# Patient Record
Sex: MALE | Employment: OTHER | URBAN - METROPOLITAN AREA
[De-identification: names, ages, dates, MRNs, and addresses within clinical notes are randomized per-mention and may not be internally consistent; named-entity substitution may affect disease eponyms.]

---

## 2017-04-24 ENCOUNTER — APPOINTMENT (OUTPATIENT)
Dept: OPHTHALMOLOGY | Facility: CLINIC | Age: 77
End: 2017-04-24
Attending: OPHTHALMOLOGY
Payer: COMMERCIAL

## 2017-04-24 DIAGNOSIS — H40.51X3 GLAUCOMA OF RIGHT EYE ASSOCIATED WITH OCULAR DISORDER, SEVERE STAGE: ICD-10-CM

## 2017-04-24 PROCEDURE — 99214 OFFICE O/P EST MOD 30 MIN: CPT | Mod: ZF

## 2017-04-24 PROCEDURE — 92083 EXTENDED VISUAL FIELD XM: CPT | Mod: ZF | Performed by: OPHTHALMOLOGY

## 2017-04-24 ASSESSMENT — SLIT LAMP EXAM - LIDS
COMMENTS: BLEPH, MGD
COMMENTS: BLEPH, MGD

## 2017-04-24 ASSESSMENT — VISUAL ACUITY
OS_CC+: -3
CORRECTION_TYPE: GLASSES
METHOD: SNELLEN - LINEAR
OD_CC: 20/300
OS_CC: 20/20

## 2017-04-24 ASSESSMENT — REFRACTION_WEARINGRX
OD_SPHERE: PLANO
SPECS_TYPE: PAL
OS_AXIS: 109
OS_CYLINDER: +1.00
OS_ADD: +2.75
OS_SPHERE: -2.25
OD_CYLINDER: +0.25
OD_AXIS: 060
OD_ADD: +2.75

## 2017-04-24 ASSESSMENT — TONOMETRY
OS_IOP_MMHG: 12
IOP_METHOD: TONOPEN
OD_IOP_MMHG: 08

## 2017-04-24 ASSESSMENT — CONF VISUAL FIELD
OS_NORMAL: 1
OD_INFERIOR_NASAL_RESTRICTION: 1
OD_INFERIOR_TEMPORAL_RESTRICTION: 1

## 2017-04-24 ASSESSMENT — CUP TO DISC RATIO: OS_RATIO: 0.6

## 2017-04-24 ASSESSMENT — EXTERNAL EXAM - LEFT EYE: OS_EXAM: NORMAL

## 2017-04-24 ASSESSMENT — EXTERNAL EXAM - RIGHT EYE: OD_EXAM: NORMAL

## 2017-04-24 NOTE — MR AVS SNAPSHOT
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      Your next 10 appointments already scheduled     Nov 03, 2017  8:45 AM CDT   RETURN GLAUCOMA with Yaneth Burnette MD   Eye Clinic ([facility])    [address]  [address]  [facility] [address] [phone]            Nov 03, 2017  9:45 AM CDT   RETURN CORNEA with Deandre Johnson MD   Eye Clinic ([facility])    [address]  [address] [facility]  [address] [address]  [facility] [address] [phone]              Who to contact     Please call your clinic at [phone] to:    Ask questions about your health    Make or cancel appointments    Discuss your medicines    Learn about your test results    Speak to your doctor   If you have compliments or concerns about an experience at your clinic, or if you wish to file a complaint, please contact [facility] Patient Relations at [phone] or email us at [email or web address]         Additional Information About Your Visit        MyChart Information     zulily is an electronic gateway that provides easy, online access to your medical records. With zulily, you can request a clinic appointment, read your test results, renew a prescription or communicate with your care team.     To sign up for Tatara Systemst visit the website at [email or web address]   You will be asked to enter the access code listed below, as well as some personal information.  Please follow the directions to create your username and password.     Your access code is: [email or web address]  Expires: 2017  6:31 AM     Your access code will  in 90 days. If you need help or a new code, please contact your [facility] or call [phone] for assistance.        Care EveryWhere ID     This is your Care EveryWhere ID. This could be used by other organizations to access your [place] medical records  [id number]         Blood Pressure from Last 3 Encounters:   No data found for BP    Weight from Last 3 Encounters:   No data found for Wt              We Performed the Following     Low Vision Central OU        Primary Care Provider    Unknown Primary MD [place]       No address on file        Thank you!     Thank you for choosing EYE CLINIC  for your care. Our goal is always to provide you with excellent care. Hearing back from our patients is one way we can continue to improve our services. Please take a few minutes to complete the written survey that you may receive in the mail after your visit with us. Thank you!             Your Updated Medication List - Protect others around you: Learn how to safely use, store and throw away your medicines at www.disposemymeds.org.          This list is accurate as of: 17 10:42 AM.  Always use your most recent med list.                   Brand Name Dispense Instructions for use    ASPIRIN ADULT LOW STRENGTH PO      Take 1 tablet by mouth daily       BROMOCRIPTINE MESYLATE PO      Take 2.5 mg by mouth daily       * fluorometholone 0.1 % ophthalmic susp    FML LIQUIFILM    10 mL    Place 1 drop into the right eye every other day       * fluorometholone 0.1 % ophthalmic susp    FML LIQUIFILM    5 mL    Place 1 drop into the right eye every other day       loratadine 10 MG tablet    CLARITIN     Take 10 mg by mouth as needed       Multi-vitamin Tabs tablet      Take 1 tablet by mouth daily       OMEGA-3 FISH OIL PO      Take 1,000  mg by mouth daily       REFRESH OP      Apply 1 drop to eye as needed       VITAMIN C PO          * Notice:  This list has 2 medication(s) that are the same as other medications prescribed for you. Read the directions carefully, and ask your doctor or other care provider to review them with you.

## 2017-04-24 NOTE — NURSING NOTE
Chief Complaints and History of Present Illnesses   Patient presents with     Glaucoma Follow Up     6 month f/u with LVC     HPI    Affected eye(s):  Both   Symptoms:     No decreased vision   No floaters   Flashes (Comment: has occasional episodes of bright light that appears in the center and then moves to the left; clears after about 30 minutes)   Tearing (Comment: right eye tearing more with more use of eyes)      Duration:  6 months      Do you have eye pain now?:  No      Comments:  6 month f/u secondary glaucoma with LVC today  Pt wonders if the glaucoma has gotten worse in the right eye?  Pt states distance vision seems stable since last visit    Ocular meds:  FML QOD right eye  Shan drops only occasionally  Artificial Tears pretty regularly    Tammy CASTANEDA 9:01 AM April 24, 2017

## 2017-04-24 NOTE — PROGRESS NOTES
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 Secondary Glaucoma   - visual field with fluctuation and poor reliability both eyes    - IOP good   -OCT retinal nerve fiber layer last viist  thinner right eye although intraocular pressure excellent, normal and stable left eye     2. Fuchs Endothelial Dystrophy   - s/p DSAEK follows with Dr. Johnson   - on FML and Shan     3. Epiretinal Membrane, Right Eye   Macular pucker   Macular cyst, hole, pseudohole   Not interested in surgical repair    4. Pseudophakia, Both Eyes    Plan   Return 6 months with OCT retinal nerve fiber layer and dilation       Attending Physician Attestation:  Complete documentation of historical and exam elements from today's encounter can be found in the full encounter summary report (not reduplicated in this progress note). I personally obtained the chief complaint(s) and history of present illness. I confirmed and edited asnecessary the review of systems, past medical/surgical history, family history, social history, and examination findings as documented by others; and I examined the patient myself. I personally reviewed the relevant tests, images, and reports as documented above. I formulated and edited as necessary the assessment and plan and discussed the findings and management plan with the patient and family.  - Yaneth Burnette MD 10:24 AM 4/24/2017

## 2017-11-03 ENCOUNTER — OFFICE VISIT (OUTPATIENT)
Dept: OPHTHALMOLOGY | Facility: CLINIC | Age: 77
End: 2017-11-03
Attending: OPHTHALMOLOGY
Payer: COMMERCIAL

## 2017-11-03 DIAGNOSIS — H18.519 FUCHS' CORNEAL DYSTROPHY: Primary | ICD-10-CM

## 2017-11-03 DIAGNOSIS — H40.51X3 GLAUCOMA OF RIGHT EYE ASSOCIATED WITH OCULAR DISORDER, SEVERE STAGE: ICD-10-CM

## 2017-11-03 DIAGNOSIS — Z94.7 CORNEA REPLACED BY TRANSPLANT: ICD-10-CM

## 2017-11-03 DIAGNOSIS — H40.9 GLAUCOMA: Primary | ICD-10-CM

## 2017-11-03 PROCEDURE — 92133 CPTRZD OPH DX IMG PST SGM ON: CPT | Mod: ZF | Performed by: OPHTHALMOLOGY

## 2017-11-03 PROCEDURE — 92015 DETERMINE REFRACTIVE STATE: CPT | Mod: GY,ZF

## 2017-11-03 PROCEDURE — 40000269 ZZH STATISTIC NO CHARGE FACILITY FEE: Mod: ZF

## 2017-11-03 PROCEDURE — 99214 OFFICE O/P EST MOD 30 MIN: CPT | Mod: ZF

## 2017-11-03 ASSESSMENT — REFRACTION_MANIFEST
OD_AXIS: 057
OD_AXIS: 057
OD_SPHERE: -2.50
OD_ADD: +2.75
OS_CYLINDER: +1.25
OS_AXIS: 105
OD_CYLINDER: +0.50
OD_CYLINDER: +0.50
OD_SPHERE: -2.50
OS_SPHERE: -2.25
OS_CYLINDER: +1.25
OS_AXIS: 105
OS_SPHERE: -2.25
OS_ADD: +2.75
OD_ADD: +2.75
OS_ADD: +2.75

## 2017-11-03 ASSESSMENT — TONOMETRY
OD_IOP_MMHG: 08
OD_IOP_MMHG: 9
IOP_METHOD: APPLANATION
IOP_METHOD: APPLANATION
OS_IOP_MMHG: 10
OS_IOP_MMHG: 10
IOP_METHOD: APPLANATION
OD_IOP_MMHG: 08
OS_IOP_MMHG: 10

## 2017-11-03 ASSESSMENT — EXTERNAL EXAM - RIGHT EYE
OD_EXAM: BROW PTOSIS
OD_EXAM: BROW PTOSIS

## 2017-11-03 ASSESSMENT — VISUAL ACUITY
OD_PH_CC: 20/200
OD_CC: 20/250
METHOD: SNELLEN - LINEAR
OD_PH_CC: 20/200
CORRECTION_TYPE: GLASSES
OD_CC: 20/250
METHOD: SNELLEN - LINEAR
OS_CC: 20/30
OS_CC: 20/30
CORRECTION_TYPE: GLASSES

## 2017-11-03 ASSESSMENT — REFRACTION_WEARINGRX
OS_SPHERE: -2.25
OS_AXIS: 109
OD_ADD: +2.75
OD_SPHERE: PLANO
OD_ADD: +2.75
SPECS_TYPE: PAL
OD_CYLINDER: +0.25
OS_ADD: +2.75
OD_CYLINDER: +0.25
OS_CYLINDER: +1.00
OS_AXIS: 109
OS_ADD: +2.75
OS_CYLINDER: +1.00
OD_AXIS: 060
SPECS_TYPE: PAL
OD_SPHERE: PLANO
OS_SPHERE: -2.25
OD_AXIS: 060

## 2017-11-03 ASSESSMENT — CONF VISUAL FIELD
OS_NORMAL: 1
METHOD: COUNTING FINGERS
OS_NORMAL: 1
METHOD: COUNTING FINGERS

## 2017-11-03 ASSESSMENT — CUP TO DISC RATIO
OS_RATIO: 0.6
OS_RATIO: 0.6

## 2017-11-03 ASSESSMENT — PACHYMETRY
OD_CT(UM): 588
OS_CT(UM): 598

## 2017-11-03 ASSESSMENT — EXTERNAL EXAM - LEFT EYE
OS_EXAM: BROW PTOSIS
OS_EXAM: BROW PTOSIS

## 2017-11-03 NOTE — NURSING NOTE
Chief Complaints and History of Present Illnesses   Patient presents with     Glaucoma Follow Up     6 month follow up both eyes.     HPI    Affected eye(s):  Both   Symptoms:     No floaters   Flashes (Comment: Occasional flashing lights in BE, no changes.)   No redness   No Dryness         Do you have eye pain now?:  No      Comments:  Pt states vision is the same as last visit.    Kevin ANGLIN November 3, 2017 8:47 AM

## 2017-11-03 NOTE — MR AVS SNAPSHOT
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      Your next 10 appointments already scheduled     Dec 05, 2017  1:00 PM CST   NEW GENERAL with Polly Rivas MD   Eye Clinic ([facility])    [address]  [address]  [address]  [facility] [address]   [phone]            May 04, 2018  9:00 AM CDT   VISUAL FIELD with [facility] EYE VISUAL FIELD   Eye Clinic ([facility])    [address]  [address]  [address]  [facility] [address]   [phone]            May 04, 2018  9:30 AM CDT   RETURN GLAUCOMA with Yaneth Burnette MD   Eye Clinic ([facility])    [address]  [address]  [address]  [facility] [address]   [phone]              Who to contact     Please call your clinic at [phone] to:    Ask questions about your health    Make or cancel appointments    Discuss your medicines    Learn about your test results    Speak to your doctor   If you have compliments or concerns about an experience at your clinic, or if you wish to file a complaint, please contact [facility] Patient Relations at [phone] or email us at [email or web address]         Additional Information About Your Visit        [employer]hart Information     ALKALINE WATER is an electronic gateway that provides easy, online access to your medical records. With ALKALINE WATER, you can request a clinic appointment, read your test results, renew a prescription or communicate with  your care team.     To sign up for etrigghart visit the website at [email or web address]   You will be asked to enter the access code listed below, as well as some personal information. Please follow the directions to create your username and password.     Your access code is: [email or web address]  Expires: 2018  5:30 AM     Your access code will  in 90 days. If you need help or a new code, please contact your [facility] or call [phone] for assistance.        Care EveryWhere ID     This is your Care EveryWhere ID. This could be used by other organizations to access your [place] medical records  [id number]         Blood Pressure from Last 3 Encounters:   No data found for BP    Weight from Last 3 Encounters:   No data found for Wt              We Performed the Following     US OPHTHALMIC DIAG, PACHYMETRY        Primary Care Provider    None Specified       No primary provider on file.        Equal Access to Services     [facility] HospitalCHIKI : Hadii matteo castilloo Soita, waaxda luqadaha, qaybta kaalmada adekvngyada, rosendo rivera . So [facility] [phone].    ATENCIÓN: Si habla español, tiene a darling disposición servicios gratuitos de asistencia lingüística. Llame al [phone].    We comply with applicable federal civil rights laws and [place] laws. We do not discriminate on the basis of race, color, national origin, age, disability, sex, sexual orientation, or gender identity.            Thank you!     Thank you for choosing EYE CLINIC  for your care. Our goal is always to provide you with excellent care. Hearing back from our patients is one way we can continue to improve our services. Please take a few minutes to complete the written survey that you may receive in the mail after your visit with us. Thank you!             Your Updated Medication List - Protect others around you: Learn how to safely use, store and throw away your medicines at  www.disposemymeds.org.          This list is accurate as of: 11/3/17 11:59 PM.  Always use your most recent med list.                   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

## 2017-11-03 NOTE — NURSING NOTE
Chief Complaints and History of Present Illnesses   Patient presents with     Follow Up For     yearly follow up Fuchs s/p Partial thickness corneal transplant endothelial keratoplasty 3/2012 right eye     HPI    Affected eye(s):  Both   Symptoms:     No floaters   No flashes   No redness   No Dryness         Do you have eye pain now?:  No      Comments:  Pt states vision is the same as last visit.    Kevin ANGLIN November 3, 2017 8:49 AM

## 2017-11-03 NOTE — MR AVS SNAPSHOT
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      Your next 10 appointments already scheduled     Dec 05, 2017  1:00 PM CST   NEW GENERAL with Polly Rivas MD   Eye Clinic ([facility])    [address]  [address]  [address]  [facility] [address]   [phone]            May 04, 2018  9:00 AM CDT   VISUAL FIELD with [facility] EYE VISUAL FIELD   Eye Clinic ([facility])    [address]  [address]  [address]  [facility] [address]   [phone]            May 04, 2018  9:30 AM CDT   RETURN GLAUCOMA with Yaneth Burnette MD   Eye Clinic ([facility])    [address]  [address]  [address]  [facility] [address]   [phone]              Who to contact     Please call your clinic at [phone] to:    Ask questions about your health    Make or cancel appointments    Discuss your medicines    Learn about your test results    Speak to your doctor   If you have compliments or concerns about an experience at your clinic, or if you wish to file a complaint, please contact [facility] Patient Relations at [phone] or email us at [email or web address]         Additional Information About Your Visit        MyChart Information     Ziptr is an electronic gateway that provides easy, online access to your medical records. With Ziptr, you can request a clinic appointment, read your test results,  renew a prescription or communicate with your care team.     To sign up for Callio Technologieshart visit the website at [email or web address]   You will be asked to enter the access code listed below, as well as some personal information. Please follow the directions to create your username and password.     Your access code is: [email or web address]  Expires: 2018  6:30 AM     Your access code will  in 90 days. If you need help or a new code, please contact your [facility] or call [phone] for assistance.        Care EveryWhere ID     This is your Care EveryWhere ID. This could be used by other organizations to access your [place] medical records  [id number]         Blood Pressure from Last 3 Encounters:   No data found for BP    Weight from Last 3 Encounters:   No data found for Wt              We Performed the Following     OCT Optic Nerve RNFL Spectralis OU (both eyes)        Primary Care Provider    None Specified       No primary provider on file.        Equal Access to Services     JAVIER WILL : Hadii matteo castilloo Soita, waaxda lunancyadaha, qaybta kaalmada adeceleste, rosendo rivera . So [facility] [phone].    ATENCIÓN: Si habla español, tiene a darling disposición servicios gratuitos de asistencia lingüística. Llame al [phone].    We comply with applicable federal civil rights laws and [place] laws. We do not discriminate on the basis of race, color, national origin, age, disability, sex, sexual orientation, or gender identity.            Thank you!     Thank you for choosing EYE CLINIC  for your care. Our goal is always to provide you with excellent care. Hearing back from our patients is one way we can continue to improve our services. Please take a few minutes to complete the written survey that you may receive in the mail after your visit with us. Thank you!             Your Updated Medication List - Protect others around you: Learn how to  safely use, store and throw away your medicines at www.disposemymeds.org.          This list is accurate as of: 11/3/17 11:41 AM.  Always use your most recent med list.                   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

## 2017-11-03 NOTE — PROGRESS NOTES
CC: s/p Partial thickness corneal transplant endothelial keratoplasty OD    HPI: Feels vision is getting slightly blurrier in the left eye.  No pain.  Constant.  Moderate.  He is not interested in surgery.    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   Artifical Tears as needed   Refresh pm at bedtime both eyes     Review of Imaging:  [facility] visual field 4/2017   Right eye with constriction, severe, stable but poor reliability   Left eye with poor reliability and scatter, no focal change    OCT retinal nerve fiber layer 11/03/17:   Right eye: lamellar hole. Worsening inferior nasal thinning (-7) now borderline may be related to irregularity of retina. Stable superior thinning.  Areas of drop out.    Left eye: all green stable.     Assessment  1. Secondary Glaucoma   - visual field with fluctuation and poor reliability both eyes    - IOP good   -OCT retinal nerve fiber layer last viist  thinner right eye may be related to abnormal tracing. IOP  intraocular pressure excellent, normal and stable left eye     2. Fuchs Endothelial Dystrophy   - s/p DSAEK OD   - on FML, continue every other day   - no longer taking Shan 128   - discussed guttae affecting vision OS, but not bothered enough by vision to pursue DSAEK    3. Epiretinal Membrane, Right Eye - follows with [facility]   Macular pucker   Macular cyst, hole, pseudohole   Not interested in surgical repair    4. Pseudophakia, Both Eyes    F/u with glaucoma and retina  1 year with cornea    Abilio Enriquez, DO  Cornea Fellow      ~~~~~~~~~~~~~~~~~~~~~~~~~~~~~~~~~~~~~~~~~~~~~~~~~~~~~~~~~~~~~~~~    Complete documentation of historical and exam elements from today's encounter can be found in the full encounter summary report (not reduplicated in this progress note). I personally obtained the chief complaint(s) and history of present illness.  I confirmed and  edited as necessary the review of systems, past medical/surgical history, family history, social history, and examination findings as documented by others.  I examined the patient myself, and I personally reviewed the relevant tests, images, and reports as documented above. I formulated and edited as necessary the assessment and plan and discussed the findings and management plan with the patient and family.     Deandre Johnson MD, MA  Director, Cornea & Anterior Segment  [facility] Department of Ophthalmology & Visual Neuroscience

## 2017-11-03 NOTE — PROGRESS NOTES
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   Artifical Tears as needed   Refresh pm at bedtime both eyes     Review of Imaging:  [facility] visual field 4/2017   Right eye with constriction, severe, stable but poor reliability   Left eye with poor reliability and scatter, no focal change    OCT retinal nerve fiber layer 11/03/17:   Right eye: lamellar hole. Worsening inferior nasal thinning (-7) now borderline may be related to irregularity of retina. Stable superior thinning.  Areas of drop out.    Left eye: all green stable.     Assessment  1. Secondary Glaucoma   - visual field with fluctuation and poor reliability both eyes    - IOP good   -OCT retinal nerve fiber layer last viist  thinner right eye may be related to abnormal tracing. IOP  intraocular pressure excellent, normal and stable left eye     2. Fuchs Endothelial Dystrophy   - s/p DSAEK follows with Dr. Johnson   - on FML   - no longer taking Shan 128    3. Epiretinal Membrane, Right Eye   Macular pucker   Macular cyst, hole, pseudohole   Not interested in surgical repair    4. Pseudophakia, Both Eyes    Plan  Return 6 months with [facility] both eyes   Increase artificial tears and consider adding gel or ointment  Referral to retina regarding lamellar hole/ERM and retinal thickening with Dr. Rob Damon MD  Ophthalmology PGY3    Attending Physician Attestation:  Complete documentation of historical and exam elements from today's encounter can be found in the full encounter summary report (not reduplicated in this progress note). I personally obtained the chief complaint(s) and history of present illness. I confirmed and edited asnecessary the review of systems, past medical/surgical history, family history, social history, and examination  findings as documented by others; and I examined the patient myself. I personally reviewed the relevant tests, images, and reports as documented above. I formulated and edited as necessary the assessment and plan and discussed the findings and management plan with the patient and family.  - Yaneth Burnette MD 10:39 AM 11/3/2017

## 2017-11-28 DIAGNOSIS — H35.371 EPIRETINAL MEMBRANE (ERM) OF RIGHT EYE: Primary | ICD-10-CM

## 2018-02-06 ENCOUNTER — OFFICE VISIT (OUTPATIENT)
Dept: OPHTHALMOLOGY | Facility: CLINIC | Age: 78
End: 2018-02-06
Attending: OPHTHALMOLOGY
Payer: COMMERCIAL

## 2018-02-06 DIAGNOSIS — H35.341 LAMELLAR MACULAR HOLE, RIGHT: Primary | ICD-10-CM

## 2018-02-06 DIAGNOSIS — H43.812 VITREOUS DEGENERATION AND DETACHMENT OF LEFT EYE: ICD-10-CM

## 2018-02-06 DIAGNOSIS — H35.371 EPIRETINAL MEMBRANE (ERM) OF RIGHT EYE: ICD-10-CM

## 2018-02-06 PROCEDURE — 92134 CPTRZ OPH DX IMG PST SGM RTA: CPT | Mod: ZF | Performed by: OPHTHALMOLOGY

## 2018-02-06 PROCEDURE — G0463 HOSPITAL OUTPT CLINIC VISIT: HCPCS | Mod: ZF

## 2018-02-06 ASSESSMENT — EXTERNAL EXAM - RIGHT EYE: OD_EXAM: NORMAL

## 2018-02-06 ASSESSMENT — SLIT LAMP EXAM - LIDS
COMMENTS: NORMAL
COMMENTS: NORMAL

## 2018-02-06 ASSESSMENT — CUP TO DISC RATIO
OS_RATIO: 0.7
OD_RATIO: 0.7

## 2018-02-06 ASSESSMENT — EXTERNAL EXAM - LEFT EYE: OS_EXAM: NORMAL

## 2018-02-06 ASSESSMENT — VISUAL ACUITY
OS_CC: 20/30
OS_CC+: +1
METHOD: SNELLEN - LINEAR
OD_CC: 20/400

## 2018-02-06 ASSESSMENT — TONOMETRY
OD_IOP_MMHG: 8
OS_IOP_MMHG: 10
IOP_METHOD: TONOPEN

## 2018-02-06 NOTE — PROGRESS NOTES
CC -   ERM OD    INTERVAL HISTORY - No changes since SHARMAINE with me, last saw me 2014    HPI -   Lupillo Garcia is a  77 year old year-old patient with history of severe ERM OD, seen by DDK 2014 elected to observe      PAST OCULAR SURGERY  DSAEK OD 2012  Tube OD 2014  CE/IOL OU    RETINAL IMAGING:  OCT   OD - severe ERM with 2+ edema, lamellar hole, ?PVD  OS - tr ERM, ?PVD      ASSESSMENT & PLAN    1.  ERM OD with lamellar hole   - stable since 2014   - wishes to observe      2. ERM OS   - very mild      3. PVD OU      4.  OAG OD   - sees Vasile    5.  DSAEK OD   - sees Page    return to clinic: 1 year, OCT OU    ATTESTATION     Attending Attestation:     Complete documentation of historical and exam elements from today's encounter can be found in the full encounter summary report (not reduplicated in this progress note).  I personally obtained the chief complaint(s) and history of present illness.  I confirmed and edited as necessary the review of systems, past medical/surgical history, family history, social history, and examination findings as documented by others; and I examined the patient myself.  I personally reviewed the relevant tests, images, and reports as documented above.  I formulated and edited as necessary the assessment and plan and discussed the findings and management plan with the patient and family    Polly Rivas MD, PhD  , Vitreoretinal Surgery  Department of Ophthalmology  [facility]

## 2018-02-06 NOTE — NURSING NOTE
"Chief Complaints and History of Present Illnesses   Patient presents with     Follow Up For     Macular Cyst RE      HPI    Last Eye Exam:  11/3/17   Affected eye(s):  Right   Symptoms:     Blurred vision   Floaters   Flashes (Comment: \" Notice this happening while eating a pickle and cheese at the same time. \" )      Duration:  3 months   Frequency:  Constant       Do you have eye pain now?:  No      Comments:  Pt returns for follow up on RE. Vision is more blurry, gradual progressive changes noticed since last visit. No problems using FML, notes that he uses it every other day. RAMÍREZ WASHINGTON, COA 9:56 AM 02/06/2018                "

## 2018-02-06 NOTE — MR AVS SNAPSHOT
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      Your next 10 appointments already scheduled     May 04, 2018  9:00 AM CDT   VISUAL FIELD with [facility] EYE VISUAL FIELD   Eye Clinic ([facility])    [address]  [address]   [address] [address]  [facility] [address]   [phone]            May 04, 2018  9:30 AM CDT   RETURN GLAUCOMA with Yaneth Burnette MD   Eye Clinic ([facility])    [address]  [address]   [address] [address]  [facility] [address]   [phone]              Who to contact     Please call your clinic at [phone] to:    Ask questions about your health    Make or cancel appointments    Discuss your medicines    Learn about your test results    Speak to your doctor   If you have compliments or concerns about an experience at your clinic, or if you wish to file a complaint, please contact [facility] Patient Relations at [phone] or email us at [email or web address]         Additional Information About Your Visit        MyChart Information     [employer] gives you secure access to your electronic health record. If you see a primary care provider, you can also send messages to your care team and make appointments. If you have questions, please call your primary care clinic.  If you do not have a primary care provider, please call [phone] and they will assist you.      [email or web address] is an electronic  gateway that provides easy, online access to your medical records. With [employer], you can request a clinic appointment, read your test results, renew a prescription or communicate with your care team.     To access your existing account, please contact your [facility] or call [phone] for assistance.        Care EveryWhere ID     This is your Care EveryWhere ID. This could be used by other organizations to access your [place] medical records  [id number]         Blood Pressure from Last 3 Encounters:   No data found for BP    Weight from Last 3 Encounters:   No data found for Wt              We Performed the Following     OCT Retina Spectralis OU (both eyes)        Primary Care Provider Office Phone # Fax #    Deandre Duncan [phone]       [facility] [address] [address]        Equal Access to Services     JAVIER WILL : Hadii aad ku hadasho Soomaali, waaxda luqadaha, qaybta kaalmada adeegyada, waxay kerriein hayhodan rivera . So [facility] [phone].    ATENCIÓN: Si habla español, tiene a darling disposición servicios gratuitos de asistencia lingüística. Zeynep al [phone].    We comply with applicable federal civil rights laws and [place] laws. We do not discriminate on the basis of race, color, national origin, age, disability, sex, sexual orientation, or gender identity.            Thank you!     Thank you for choosing EYE CLINIC  for your care. Our goal is always to provide you with excellent care. Hearing back from our patients is one way we can continue to improve our services. Please take a few minutes to complete the written survey that you may receive in the mail after your visit with us. Thank you!             Your Updated Medication List - Protect others around you: Learn how to safely use, store and throw away your medicines at www.disposemymeds.org.          This list is accurate as of 2/6/18 11:06  AM.  Always use your most recent med list.                   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

## 2018-05-03 DIAGNOSIS — H40.1190 POAG (PRIMARY OPEN-ANGLE GLAUCOMA): Primary | ICD-10-CM

## 2018-05-04 ENCOUNTER — APPOINTMENT (OUTPATIENT)
Dept: OPHTHALMOLOGY | Facility: CLINIC | Age: 78
End: 2018-05-04
Attending: OPHTHALMOLOGY
Payer: COMMERCIAL

## 2018-05-04 DIAGNOSIS — H40.1190 POAG (PRIMARY OPEN-ANGLE GLAUCOMA): ICD-10-CM

## 2018-05-04 PROCEDURE — 92083 EXTENDED VISUAL FIELD XM: CPT | Mod: ZF | Performed by: OPHTHALMOLOGY

## 2018-05-04 PROCEDURE — G0463 HOSPITAL OUTPT CLINIC VISIT: HCPCS | Mod: ZF

## 2018-05-04 ASSESSMENT — REFRACTION_WEARINGRX
OS_ADD: +2.75
OS_AXIS: 107
OD_AXIS: 054
OS_SPHERE: -2.50
OD_SPHERE: PLANO
OS_CYLINDER: +1.25
OD_CYLINDER: +0.75
SPECS_TYPE: PAL
OD_ADD: +2.75

## 2018-05-04 ASSESSMENT — VISUAL ACUITY
CORRECTION_TYPE: GLASSES
OS_CC: 20/30
OD_CC: 20/400
OS_CC+: +2
OD_PH_CC: 20/200
METHOD: SNELLEN - LINEAR

## 2018-05-04 ASSESSMENT — EXTERNAL EXAM - RIGHT EYE: OD_EXAM: BROW PTOSIS

## 2018-05-04 ASSESSMENT — TONOMETRY
OD_IOP_MMHG: 10
OS_IOP_MMHG: 10

## 2018-05-04 ASSESSMENT — EXTERNAL EXAM - LEFT EYE: OS_EXAM: BROW PTOSIS

## 2018-05-04 NOTE — NURSING NOTE
Chief Complaints and History of Present Illnesses   Patient presents with     Follow Up For     6mo f/u OAG OD +VF     HPI    Symptoms:     No blurred vision   No decreased vision   No foreign body sensation   No Dryness   No eye discharge         Do you have eye pain now?:  No      Comments:  Pt is here for 6mo f/u OAG OD    No changes to vision since LV. No other concerns.   Ocular meds: FML OD qEvery other Day    Caroline Mccurdy COT 10:02 AM May 4, 2018

## 2018-05-04 NOTE — PROGRESS NOTES
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   Artifical Tears as needed   Refresh pm at bedtime both eyes     Review of Imaging:  [facility] visual field 5/4/18   Poor reliability both eyes     OCT retinal nerve fiber layer 11/03/17:   Right eye: lamellar hole. Worsening inferior nasal thinning (-7) now borderline may be related to irregularity of retina. Stable superior thinning.  Areas of drop out.    Left eye: all green stable.     Assessment  1. Secondary Glaucoma   - visual field with fluctuation and poor reliability both eyes    - IOP good   -LVC unreliable, will follow with OCT now     2. Fuchs Endothelial Dystrophy   - s/p DSAEK follows with Dr. Johnson   - on FML   - no longer taking Shan 128    3. Epiretinal Membrane, Right Eye   Macular pucker   Macular cyst, hole, pseudohole   Not interested in surgical repair    4. Pseudophakia, Both Eyes    Plan  Return 6 months with OCT retinal nerve fiber layer and dilated exam  May not be able to do reliable visual field       Attending Physician Attestation:  Complete documentation of historical and exam elements from today's encounter can be found in the full encounter summary report (not reduplicated in this progress note). I personally obtained the chief complaint(s) and history of present illness. I confirmed and edited asnecessary the review of systems, past medical/surgical history, family history, social history, and examination findings as documented by others; and I examined the patient myself. I personally reviewed the relevant tests, images, and reports as documented above. I formulated and edited as necessary the assessment and plan and discussed the findings and management plan with the patient and family.  - Yaneth Burnette MD 11:56 AM  5/4/2018

## 2018-05-04 NOTE — MR AVS SNAPSHOT
After Visit Summary   5/4/2018    Lupillo Garcia    MRN: [id number]           Patient Information     Date Of Birth          1940        Visit Information        Provider Department      5/4/2018 9:30 AM Yaneth Burnette MD Eye Clinic        Today's Diagnoses     POAG (primary open-angle glaucoma)           Follow-ups after your visit        Follow-up notes from your care team     Return in about 6 months (around 11/4/2018) for OCT rnfl, dilation.      Who to contact     Please call your clinic at [phone] to:    Ask questions about your health    Make or cancel appointments    Discuss your medicines    Learn about your test results    Speak to your doctor            Additional Information About Your Visit        MyChart Information     [employer] gives you secure access to your electronic health record. If you see a primary care provider, you can also send messages to your care team and make appointments. If you have questions, please call your primary care clinic.  If you do not have a primary care provider, please call [phone] and they will assist you.      [employer] is an electronic gateway that provides easy, online access to your medical records. With [employer], you can request a clinic appointment, read your test results, renew a prescription or communicate with your care team.     To access your existing account, please contact your [facility] or call [phone] for assistance.        Care EveryWhere ID     This is your Care EveryWhere ID. This could be used by other organizations to access your [place] medical records  [id number]         Blood Pressure from Last 3 Encounters:   No data found for BP    Weight from Last 3 Encounters:   No data found for Wt              We Performed the Following     Low Vision Central OU        Primary Care Provider Office Phone # Fax #    Deandre Duncan [phone]       [facility]  [address] [address]        Equal Access to Services     DINAKEISHA SUMAN : Hadii matteo cain annaenrique Gosiaita, wafrancheskada hildamooha, qaoswaldota katorstensamuel beltrangeosamuel, rosendo tirado francismary danielsonronaldolavonne villa. So [facility] [phone].    ATENCIÓN: Si habla español, tiene a darling disposición servicios gratuitos de asistencia lingüística. [facility] [phone].    We comply with applicable federal civil rights laws and [place] laws. We do not discriminate on the basis of race, color, national origin, age, disability, sex, sexual orientation, or gender identity.            Thank you!     Thank you for choosing EYE CLINIC  for your care. Our goal is always to provide you with excellent care. Hearing back from our patients is one way we can continue to improve our services. Please take a few minutes to complete the written survey that you may receive in the mail after your visit with us. Thank you!             Your Updated Medication List - Protect others around you: Learn how to safely use, store and throw away your medicines at www.disposemymeds.org.          This list is accurate as of 5/4/18 12:04 PM.  Always use your most recent med list.                   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

## 2018-08-14 ENCOUNTER — TELEPHONE (OUTPATIENT)
Dept: OPHTHALMOLOGY | Facility: CLINIC | Age: 78
End: 2018-08-14

## 2018-08-14 NOTE — TELEPHONE ENCOUNTER
"[facility] Call Center    Phone Message    May a detailed message be left on voicemail: yes    Reason for Call: Other: Pt requested an appt with Dr. Burnette for his 6 months check up.  Dr Burnette was not available until January and pt was trying to schedule into November. I scheduled pt in January, AND pt mentioned a scan that Dr. Burnette wanted . Not sure what the scan is?  Pt wants to know IF he will have that \"scan\" when he sees Dr. Burnette in January?  Please call pt to advise about scan.       Action Taken: Message routed to:  [facility]  "

## 2018-08-14 NOTE — TELEPHONE ENCOUNTER
Left message image of eye planned for next visit per last Dr. Burnette note  Direct number left for any further assistance  Milton Fermin RN 2:34 PM 08/14/18

## 2018-08-20 ENCOUNTER — TELEPHONE (OUTPATIENT)
Dept: OPHTHALMOLOGY | Facility: CLINIC | Age: 78
End: 2018-08-20

## 2018-08-20 NOTE — TELEPHONE ENCOUNTER
Dr. Johnson has availability in November    Note to Dr. Burnette's facilitator to help assist in scheduled with dr. Burnette also same day in November per pt request  Milton Fermin RN 3:39 PM 08/20/18

## 2018-08-20 NOTE — TELEPHONE ENCOUNTER
[facility] Call Center    Phone Message    May a detailed message be left on voicemail: yes    Reason for Call: Other: Pt is trying to coordinate appt with Dr. Burnette and Dr. Johnson both on the same day in November if possible. Thursday and Friday work the best, but could also do a Monday or Tuesday. I wasn't able to get pull anything together so sending message per Pt's request.     Action Taken: Message routed to:  [facility] Eye Clinic

## 2018-08-23 NOTE — TELEPHONE ENCOUNTER
[facility] Call Center    Phone Message    May a detailed message be left on voicemail: yes    Reason for Call: Other: Per pt, still waiting for response from the team, has not heard any thing yet. Please follow up at your earliest convenience.     Action Taken: Message routed to:  [facility] EYE

## 2018-11-01 DIAGNOSIS — H18.519 FUCHS' CORNEAL DYSTROPHY: ICD-10-CM

## 2018-11-01 DIAGNOSIS — Z94.7 CORNEA REPLACED BY TRANSPLANT: Primary | ICD-10-CM

## 2018-11-02 ENCOUNTER — OFFICE VISIT (OUTPATIENT)
Dept: OPHTHALMOLOGY | Facility: CLINIC | Age: 78
End: 2018-11-02
Attending: OPHTHALMOLOGY
Payer: COMMERCIAL

## 2018-11-02 DIAGNOSIS — H18.519 FUCHS' CORNEAL DYSTROPHY: ICD-10-CM

## 2018-11-02 DIAGNOSIS — H40.1190 POAG (PRIMARY OPEN-ANGLE GLAUCOMA): Primary | ICD-10-CM

## 2018-11-02 DIAGNOSIS — H40.1190 POAG (PRIMARY OPEN-ANGLE GLAUCOMA): ICD-10-CM

## 2018-11-02 DIAGNOSIS — Z94.7 CORNEA REPLACED BY TRANSPLANT: ICD-10-CM

## 2018-11-02 DIAGNOSIS — H40.51X3 GLAUCOMA OF RIGHT EYE ASSOCIATED WITH OCULAR DISORDER, SEVERE STAGE: ICD-10-CM

## 2018-11-02 PROCEDURE — 92133 CPTRZD OPH DX IMG PST SGM ON: CPT | Mod: ZF | Performed by: OPHTHALMOLOGY

## 2018-11-02 PROCEDURE — 40000269 ZZH STATISTIC NO CHARGE FACILITY FEE: Mod: ZF

## 2018-11-02 PROCEDURE — G0463 HOSPITAL OUTPT CLINIC VISIT: HCPCS | Mod: ZF

## 2018-11-02 PROCEDURE — 92015 DETERMINE REFRACTIVE STATE: CPT | Mod: ZF

## 2018-11-02 ASSESSMENT — VISUAL ACUITY
CORRECTION_TYPE: GLASSES
METHOD: SNELLEN - LINEAR
OD_CC: 20/600
OS_CC: 20/40
OS_CC: 20/40
CORRECTION_TYPE: GLASSES
METHOD: SNELLEN - LINEAR
OD_CC: 20/600

## 2018-11-02 ASSESSMENT — EXTERNAL EXAM - RIGHT EYE
OD_EXAM: BROW PTOSIS
OD_EXAM: BROW PTOSIS

## 2018-11-02 ASSESSMENT — TONOMETRY
IOP_METHOD: APPLANATION
OS_IOP_MMHG: 10
IOP_METHOD: APPLANATION
OD_IOP_MMHG: 06
OD_IOP_MMHG: 06
OS_IOP_MMHG: 10

## 2018-11-02 ASSESSMENT — CONF VISUAL FIELD
OD_INFERIOR_TEMPORAL_RESTRICTION: 3
OD_SUPERIOR_TEMPORAL_RESTRICTION: 3
OD_SUPERIOR_NASAL_RESTRICTION: 3
OD_INFERIOR_NASAL_RESTRICTION: 3
METHOD: COUNTING FINGERS
OD_SUPERIOR_TEMPORAL_RESTRICTION: 3
OD_INFERIOR_NASAL_RESTRICTION: 3
OD_SUPERIOR_NASAL_RESTRICTION: 3
OD_INFERIOR_TEMPORAL_RESTRICTION: 3
OS_NORMAL: 1
OS_NORMAL: 1

## 2018-11-02 ASSESSMENT — REFRACTION_MANIFEST
OD_SPHERE: BALANCE
OS_CYLINDER: +1.00
OS_SPHERE: -1.75
OS_AXIS: 113
OS_ADD: +3.00

## 2018-11-02 ASSESSMENT — REFRACTION_WEARINGRX
OS_AXIS: 107
OD_SPHERE: PLANO
OS_AXIS: 107
OD_SPHERE: PLANO
OS_CYLINDER: +1.25
OD_CYLINDER: +0.75
OS_SPHERE: -2.50
OS_ADD: +2.75
OD_CYLINDER: +0.75
OD_ADD: +2.75
OS_CYLINDER: +1.25
OD_AXIS: 054
OS_SPHERE: -2.50
OS_ADD: +2.75
SPECS_TYPE: PAL
OD_AXIS: 054
OD_ADD: +2.75
SPECS_TYPE: PAL

## 2018-11-02 ASSESSMENT — CUP TO DISC RATIO: OS_RATIO: 0.6

## 2018-11-02 ASSESSMENT — PACHYMETRY
EXAM_DATE: 11/2/2018
OD_CT(UM): 648
OS_CT(UM): 599

## 2018-11-02 ASSESSMENT — EXTERNAL EXAM - LEFT EYE
OS_EXAM: BROW PTOSIS
OS_EXAM: BROW PTOSIS

## 2018-11-02 NOTE — NURSING NOTE
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 Pt feels the glasses are not working. Pt states not seeing as well with the glasses x 6 months.    DERREK Clark 8:54 AM November 2, 2018

## 2018-11-02 NOTE — PROGRESS NOTES
CC: s/p Partial thickness corneal transplant endothelial keratoplasty OD    HPI: Feels vision is getting slightly blurrier in the left eye.  No pain.  Constant.  Moderate.  He is not interested in surgery.    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   Artifical Tears as needed   Refresh pm at bedtime both eyes     Review of Imaging:    [facility] visual field 4/2017   Right eye with constriction, severe, stable but poor reliability   Left eye with poor reliability and scatter, no focal change    OCT retinal nerve fiber layer 11/03/17:   Right eye: lamellar hole. Worsening inferior nasal thinning (-7) now borderline may be related to irregularity of retina. Stable superior thinning.  Areas of drop out.    Left eye: all green stable.     Assessment    1. Secondary Glaucoma   - visual field with fluctuation and poor reliability both eyes    - IOP good   - OCT retinal nerve fiber layer last viist  thinner right eye may be related to abnormal tracing. IOP  intraocular pressure excellent, normal and stable left eye     2. Fuchs Endothelial Dystrophy   - s/p DSAEK OD   - on FML, continue every other day   - no longer taking Shan 128   - discussed guttae affecting vision OS, but not bothered enough by vision to pursue DSAEK    3. Epiretinal Membrane, Right Eye - follows with [facility]   Macular pucker   Macular cyst, hole, pseudohole   Not interested in surgical repair    4. Pseudophakia, Both Eyes      ~~~~~~~~~~~~~~~~~~~~~~~~~~~~~~~~~~~~~~~~~~~~~~~~~~~~~~~~~~~~~~~~    Complete documentation of historical and exam elements from today's encounter can be found in the full encounter summary report (not reduplicated in this progress note). I personally obtained the chief complaint(s) and history of present illness.  I confirmed and edited as necessary the review of systems, past medical/surgical history, family history,  social history, and examination findings as documented by others.  I examined the patient myself, and I personally reviewed the relevant tests, images, and reports as documented above. I formulated and edited as necessary the assessment and plan and discussed the findings and management plan with the patient and family.     Deandre Johnson MD, MA  Director, Cornea & Anterior Segment  [facility] Department of Ophthalmology & Visual Neuroscience

## 2018-11-02 NOTE — NURSING NOTE
Chief Complaints and History of Present Illnesses   Patient presents with     Follow Up For     6 month f/u for POAG BE.     HPI    Symptoms:     No floaters   No flashes   No redness   Tearing (Comment: RE constant tearing x 7 weeks )   No Dryness         Do you have eye pain now?:  No      Comments:  Pt here for a 6 month f/u for POAG BE. Pt feels the glasses are not working. Pt states not seeing as well with the glasses x 6 months.     Brianne Watts, [facility] 8:53 AM November 2, 2018

## 2018-11-02 NOTE — MR AVS SNAPSHOT
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      Your next 10 appointments already scheduled     May 03, 2019  8:45 AM CDT   RETURN GLAUCOMA with Yaneth Burnette MD   Eye Clinic ([email or web address])    [address]  [address] Clin [address]a  [facility] [address] [phone]              Who to contact     Please call your clinic at [phone] to:    Ask questions about your health    Make or cancel appointments    Discuss your medicines    Learn about your test results    Speak to your doctor            Additional Information About Your Visit        FoodlveharRetrace Information     [employer] gives you secure access to your electronic health record. If you see a primary care provider, you can also send messages to your care team and make appointments. If you have questions, please call your primary care clinic.  If you do not have a primary care provider, please call [phone] and they will assist you.      [employer] is an electronic gateway that provides easy, online access to your medical records. With [employer], you can request a clinic appointment, read your test results, renew a prescription or communicate with your care team.     To access your existing account, please contact your [facility] or call [phone] for assistance.        Care EveryWhere ID     This is your Care EveryWhere ID. This could be used by other organizations to access your [place] medical records  [id number]          Blood Pressure from Last 3 Encounters:   No data found for BP    Weight from Last 3 Encounters:   No data found for Wt              We Performed the Following     OCT Optic Nerve RNFL Spectralis OU (both eyes)        Primary Care Provider Office Phone # Fax #    Deandre Duncan [phone]       [facility] [address] [facility] [address]  [facility] [address]        Equal Access to Services     [facility] Hospital[facility] : Hadii aad ku hadasho Soomaali, waaxda luqadaha, qaybta kaalmada adeegyada, waxay idiin hayaan adeeg kharash la'aan ah. So [facility] [phone].    ATENCIÓN: Si habla espbela, tiene a darling disposición servicios gratuitos de asistencia lingüística. Zeynep al [phone].    We comply with applicable federal civil rights laws and [place] laws. We do not discriminate on the basis of race, color, national origin, age, disability, sex, sexual orientation, or gender identity.            Thank you!     Thank you for choosing EYE CLINIC  for your care. Our goal is always to provide you with excellent care. Hearing back from our patients is one way we can continue to improve our services. Please take a few minutes to complete the written survey that you may receive in the mail after your visit with us. Thank you!             Your Updated Medication List - Protect others around you: Learn how to safely use, store and throw away your medicines at www.disposemymeds.org.          This list is accurate as of 11/2/18 10:41 AM.  Always use your most recent med list.                   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

## 2018-11-02 NOTE — PROGRESS NOTES
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   Artifical Tears as needed   Refresh pm at bedtime both eyes     Review of Imaging:  [facility] visual field 5/4/18   Poor reliability both eyes     OCT retinal nerve fiber layer 11/02/18:   Right eye: lamellar hole. Worsening inferior nasal thinning (-7) now borderline may be related to irregularity of retina. Stable superior thinning.  Areas of drop out.    Left eye: all green stable.     Assessment  1. Secondary Glaucoma   - visual field with fluctuation and poor reliability both eyes    - IOP good   -LVC unreliable, will follow with OCT now     2. Fuchs Endothelial Dystrophy   - s/p DSAEK follows with Dr. Johnson   - on FML   - no longer taking Shan 128    3. Epiretinal Membrane, Right Eye   Macular pucker   Macular cyst, hole, pseudohole   Not interested in surgical repair    4. Pseudophakia, Both Eyes    Plan  Return 6 months with intraocular pressure check  May not be able to do reliable visual field     Attending Physician Attestation:  Complete documentation of historical and exam elements from today's encounter can be found in the full encounter summary report (not reduplicated in this progress note). I personally obtained the chief complaint(s) and history of present illness. I confirmed and edited asnecessary the review of systems, past medical/surgical history, family history, social history, and examination findings as documented by others; and I examined the patient myself. I personally reviewed the relevant tests, images, and reports as documented above. I formulated and edited as necessary the assessment and plan and discussed the findings and management plan with the patient and family.  - Yaneth Burnette MD 9:58 AM 11/2/2018

## 2018-11-02 NOTE — MR AVS SNAPSHOT
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        Fuchs' corneal dystrophy        POAG (primary open-angle glaucoma)           Follow-ups after your visit        Your next 10 appointments already scheduled     May 03, 2019  8:45 AM CDT   RETURN GLAUCOMA with Yaneth Burnette MD   Eye Clinic ([facility])    [address]  [address]  [facility] [address]   [phone]              Who to contact     Please call your clinic at [phone] to:    Ask questions about your health    Make or cancel appointments    Discuss your medicines    Learn about your test results    Speak to your doctor            Additional Information About Your Visit        MyChart Information     [employer] gives you secure access to your electronic health record. If you see a primary care provider, you can also send messages to your care team and make appointments. If you have questions, please call your primary care clinic.  If you do not have a primary care provider, please call [phone] and they will assist you.      [employer] is an electronic gateway that provides easy, online access to your medical records. With [employer], you can request a clinic appointment, read your test results, renew a prescription or communicate with your care team.     To access your existing account, please contact your [facility] or call [phone] for assistance.        Care EveryWhere ID     This is your Care EveryWhere ID. This could be used by other organizations to access your [facility] medical records  [id number]         Blood Pressure from Last 3 Encounters:   No data found for BP     Weight from Last 3 Encounters:   No data found for Wt              We Performed the Following     DILATED FUNDUS EXAM     Pachymetry OU (both eyes)        Primary Care Provider Office Phone # Fax #    Deandre Duncan [phone]       [facility] [address] [address]        Equal Access to Services     JAVIER WILL : Hadii aad ku hadasho Soomaali, waaxda luqadaha, qaybta kaalmada adeegyada, waxay idiin hayaan adeeg kharash la'aan . So [facility] [phone].    ATENCIÓN: Si habla español, tiene a darling disposición servicios gratuitos de asistencia lingüística. Zeynep al [phone].    We comply with applicable federal civil rights laws and [place] laws. We do not discriminate on the basis of race, color, national origin, age, disability, sex, sexual orientation, or gender identity.            Thank you!     Thank you for choosing EYE CLINIC  for your care. Our goal is always to provide you with excellent care. Hearing back from our patients is one way we can continue to improve our services. Please take a few minutes to complete the written survey that you may receive in the mail after your visit with us. Thank you!             Your Updated Medication List - Protect others around you: Learn how to safely use, store and throw away your medicines at www.disposemymeds.org.          This list is accurate as of 11/2/18 11:59 PM.  Always use your most recent med list.                   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

## 2019-01-01 ENCOUNTER — TELEPHONE (OUTPATIENT)
Dept: OPHTHALMOLOGY | Facility: CLINIC | Age: 79
End: 2019-01-01

## 2019-01-01 ENCOUNTER — TELEPHONE (OUTPATIENT)
Dept: NURSING | Facility: CLINIC | Age: 79
End: 2019-01-01

## 2019-01-01 ENCOUNTER — OFFICE VISIT (OUTPATIENT)
Dept: OPHTHALMOLOGY | Facility: CLINIC | Age: 79
End: 2019-01-01
Attending: STUDENT IN AN ORGANIZED HEALTH CARE EDUCATION/TRAINING PROGRAM
Payer: COMMERCIAL

## 2019-01-01 ENCOUNTER — HEALTH MAINTENANCE LETTER (OUTPATIENT)
Age: 79
End: 2019-01-01

## 2019-01-01 ENCOUNTER — OFFICE VISIT (OUTPATIENT)
Dept: OPHTHALMOLOGY | Facility: CLINIC | Age: 79
End: 2019-01-01
Attending: OPHTHALMOLOGY
Payer: COMMERCIAL

## 2019-01-01 DIAGNOSIS — H02.053 TRICHIASIS OF RIGHT EYE WITHOUT ENTROPION: ICD-10-CM

## 2019-01-01 DIAGNOSIS — H01.009 BLEPHARITIS OF BOTH UPPER AND LOWER EYELID: ICD-10-CM

## 2019-01-01 DIAGNOSIS — H18.519 FUCHS' CORNEAL DYSTROPHY: Primary | ICD-10-CM

## 2019-01-01 DIAGNOSIS — H02.106 ECTROPION DUE TO LAXITY OF EYELID, LEFT: ICD-10-CM

## 2019-01-01 DIAGNOSIS — Z94.7 CORNEA REPLACED BY TRANSPLANT: Primary | ICD-10-CM

## 2019-01-01 DIAGNOSIS — H02.055 TRICHIASIS OF LEFT LOWER EYELID: ICD-10-CM

## 2019-01-01 DIAGNOSIS — H18.519 FUCHS' CORNEAL DYSTROPHY: ICD-10-CM

## 2019-01-01 DIAGNOSIS — H40.51X3 GLAUCOMA OF RIGHT EYE ASSOCIATED WITH OCULAR DISORDER, SEVERE STAGE: ICD-10-CM

## 2019-01-01 DIAGNOSIS — H40.51X3 GLAUCOMA OF RIGHT EYE ASSOCIATED WITH OCULAR DISORDER, SEVERE STAGE: Primary | ICD-10-CM

## 2019-01-01 DIAGNOSIS — Z94.7 CORNEA REPLACED BY TRANSPLANT: ICD-10-CM

## 2019-01-01 DIAGNOSIS — H04.123 DRY EYE SYNDROME OF BOTH EYES: ICD-10-CM

## 2019-01-01 PROCEDURE — 67820 REVISE EYELASHES: CPT | Mod: RT,ZF | Performed by: STUDENT IN AN ORGANIZED HEALTH CARE EDUCATION/TRAINING PROGRAM

## 2019-01-01 PROCEDURE — G0463 HOSPITAL OUTPT CLINIC VISIT: HCPCS | Mod: 25,ZF

## 2019-01-01 PROCEDURE — G0463 HOSPITAL OUTPT CLINIC VISIT: HCPCS | Mod: ZF

## 2019-01-01 ASSESSMENT — EXTERNAL EXAM - LEFT EYE
OS_EXAM: BROW PTOSIS

## 2019-01-01 ASSESSMENT — CONF VISUAL FIELD
OD_INFERIOR_NASAL_RESTRICTION: 3
METHOD: COUNTING FINGERS
OD_INFERIOR_TEMPORAL_RESTRICTION: 1
OD_SUPERIOR_TEMPORAL_RESTRICTION: 3
OD_SUPERIOR_NASAL_RESTRICTION: 3
METHOD: COUNTING FINGERS
OD_INFERIOR_NASAL_RESTRICTION: 1
OD_INFERIOR_TEMPORAL_RESTRICTION: 1
OD_SUPERIOR_NASAL_RESTRICTION: 3
OD_SUPERIOR_TEMPORAL_RESTRICTION: 3
OS_NORMAL: 1
METHOD: COUNTING FINGERS
OD_INFERIOR_TEMPORAL_RESTRICTION: 3
OD_INFERIOR_NASAL_RESTRICTION: 1

## 2019-01-01 ASSESSMENT — VISUAL ACUITY
OS_CC: 20/30
OS_CC+: +2
OD_CC: HM
CORRECTION_TYPE: GLASSES
METHOD: SNELLEN - LINEAR
OS_CC+: -2
OS_CC: 20/30
OD_CC: 20/500
METHOD: SNELLEN - LINEAR
CORRECTION_TYPE: GLASSES
OD_CC: 20/400
METHOD: SNELLEN - LINEAR
OS_CC: 20/30

## 2019-01-01 ASSESSMENT — CUP TO DISC RATIO
OD_RATIO: 0.7
OS_RATIO: 0.6
OD_RATIO: 0.7

## 2019-01-01 ASSESSMENT — TONOMETRY
OS_IOP_MMHG: 06
OD_IOP_MMHG: 06
IOP_METHOD: APPLANATION
OD_IOP_MMHG: 6
OS_IOP_MMHG: 7
IOP_METHOD: APPLANATION
OD_IOP_MMHG: 5
OS_IOP_MMHG: 8
IOP_METHOD: ICARE

## 2019-01-01 ASSESSMENT — REFRACTION_WEARINGRX
OD_ADD: +2.75
OS_AXIS: 107
SPECS_TYPE: PAL
OD_CYLINDER: +0.75
OD_SPHERE: PLANO
OD_SPHERE: PLANO
SPECS_TYPE: PAL
OS_AXIS: 107
OD_AXIS: 054
OS_ADD: +2.75
OS_CYLINDER: +1.25
OS_CYLINDER: +1.25
OS_ADD: +2.75
OD_ADD: +2.75
OS_SPHERE: -2.50
OD_AXIS: 054
OD_CYLINDER: +0.75
OS_SPHERE: -2.50

## 2019-01-01 ASSESSMENT — EXTERNAL EXAM - RIGHT EYE
OD_EXAM: BROW PTOSIS

## 2019-04-25 NOTE — TELEPHONE ENCOUNTER
Scheduled June 10th at 10 Am  Pt aware of date/time/location   Milton Fermin RN 2:40 PM 04/25/19      [facility]    Phone Message    May a detailed message be left on voicemail: yes    Reason for Call: Other: per pts wife felicitas, is calling because she had to cancel 6 month f/u with , there is nothing available until end of july, pt needs to be seen before as they are a glaucoma pt, they are wanting to come in early june, mid day, please call felicitas back thanks     Action Taken: Message routed to:  [facility]

## 2019-06-10 NOTE — NURSING NOTE
Chief Complaints and History of Present Illnesses   Patient presents with     Follow Up     Chief Complaint(s) and History of Present Illness(es)     Follow Up     Laterality: right eye    Associated symptoms: dryness and tearing (right eye, intermittent).  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  He states that in the lower visual field of his right eye, he see darkness, which may have worsened slightly since his last exam, 7 months ago.    Angelica Saravia COT 10:03 AM Sheron 10, 2019

## 2019-06-10 NOTE — PROGRESS NOTES
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   Artifical Tears as needed   Refresh pm at bedtime both eyes     Review of Imaging:  [facility] visual field 5/4/18   Poor reliability both eyes     OCT retinal nerve fiber layer 11/02/18:   Right eye: lamellar hole. Worsening inferior nasal thinning (-7) now borderline may be related to irregularity of retina. Stable superior thinning.  Areas of drop out.    Left eye: all green stable.     Assessment  1. Secondary Glaucoma   - visual field with fluctuation and poor reliability both eyes    - IOP good   -LVC unreliable, will follow with OCT now     2. Fuchs Endothelial Dystrophy   - s/p DSAEK follows with Dr. Johnson   - on FML   - no longer taking Shan 128    3. Epiretinal Membrane, Right Eye   Macular pucker   Macular cyst, hole, pseudohole   Not interested in surgical repair    4. Pseudophakia, Both Eyes    Plan  Not able to do visual field so will follow with yearly OCT retinal nerve fiber layer   Return to clinic 6 months with OCT retinal nerve fiber layer     Attending Physician Attestation:  Complete documentation of historical and exam elements from today's encounter can be found in the full encounter summary report (not reduplicated in this progress note). I personally obtained the chief complaint(s) and history of present illness. I confirmed and edited asnecessary the review of systems, past medical/surgical history, family history, social history, and examination findings as documented by others; and I examined the patient myself. I personally reviewed the relevant tests, images, and reports as documented above. I formulated and edited as necessary the assessment and plan and discussed the findings and management plan with the patient and family.  Avni QUINN  Vasile ROMANO 10:37 AM 6/10/2019

## 2019-09-20 NOTE — TELEPHONE ENCOUNTER
.[facility]: Nurse Triage Note  SITUATION/BACKGROUND                                                      Lupillo Garcia is a 78 year old male calls to report that for the past few mornings awakened to have scum over on his eyes. Almost like looking through snow. After cleaning his face/eyes vision is better.   Patient's wife, Neha joined conversation and reported that crust began on his left eye and has since moved to his right. No noticeable increase in crust.   Has applied warm compress to eyes about 2 x during the past few days. No pain, pressure, light sensitivity fever, or redness, loss/ blurred or double viions at this time.   Denies any other S/S of protocol.   Routed to Eye Clinic as High Priority and follow up call.   MEDICATIONS:     Allergies: No Known Allergies      RECOMMENDATION/PLAN                                                      RECOMMENDED DISPOSITION:  Home care instructions given per protocol. Report if no improvement after 48 hrs.     Seek ED care with severe pain, sudden loss of vision or blurred or double vision ; sudden onset of unequal pupil size; blood in the colored part of the eye; redness and unable to open eye or keep it open; fever, light sensitivity, bilateral swelling, and redness.  Will comply with recommendation: Yes    If further questions/concerns or if symptoms do not improve, worsen or new symptoms develop, call your PCP or [phone] to talk with the Resident on call, as soon as possible.    Guideline used: Eye Problem  Telephone Triage Protocols for Nurses, Fifth Edition, Guerline Vargas, RN, RN

## 2019-09-20 NOTE — TELEPHONE ENCOUNTER
Spoke with patient.  Reviewed warm lid compresses and advised that he continue doing them for twice a day for 5 minutes each time.    Offered patient an appointment as early as tomorrow.  He declined scheduling stating that his wife was driving at the moment and she was the person who would need to make his appointment.      Patient instructed he should come in to be seen if he had any decrease in vision.  He assures me that he has not had any vision changes with his current symptoms.    Patient states that he will schedule an exam soon.    Angelica Saravia, COT 1:18 PM  September 20, 2019

## 2019-09-25 NOTE — TELEPHONE ENCOUNTER
Spoke to pt at 1419  Mattering in AM and tearing during day past couple days  Some redness noted-- not significant  New light sensitivities in past 3 days    Symptoms can be in either eye or both eyes    No noticed vision changes    Some pressure pain above right eye noted past couple days    Offered appt tomorrow afternoon and pt unable    Scheduled Friday with Dr. Dickey-- reviewed Dr. Burnette in clinic for any urgent assistance in plan of care    Reviewed to call clinic tomorrow for any worsening pain and/or vision changes    Pt verbally demonstrated understanding  Milton Fermin RN RN 2:28 PM 09/25/19    Note to Dr. Chicas for review       [facility]    Phone Message    May a detailed message be left on voicemail: yes    Reason for Call: Symptoms or Concerns     If patient has red-flag symptoms, warm transfer to triage line    Current symptom or concern: Pt has been having mattery stuff in both eyes through out the day for the last couple of days. His right glaucoma eye is hurting and pt feels like there is pressure in that eye. Pt would like to come in for an appt.     Symptoms have been present for:  several day(s)    Has patient previously been seen for this? Yes    By : billie    Date: JUNE    Are there any new or worsening symptoms? No      Action Taken: Message routed to:  [facility]

## 2019-09-27 NOTE — PATIENT INSTRUCTIONS
"START WARM COMPRESSES TWICE PER DAY IN BOTH EYES - PLACE A DAMP WASHCLOTH INTO MICROWAVE 10-15 SECONDS, PLACE ONTO EYELIDS FOR 5 MINUTES.    EYELID SCRUBS W/ BABY SHAMPOO TWICE PER DAY.    INCREASE ARTIFICIAL TEAR USE TO 4-6 TIMES PER DAY.     START ARTIFICIAL TEAR OINTMENT (REFRESH PM), ABOUT 1/4\" TO EACH LOWER EYELID RIGHT BEFORE BEDTIME.   "

## 2019-09-27 NOTE — PROGRESS NOTES
"Chief Complaint(s) and History of Present Illness(es)     Glaucoma Follow-Up               Comments     Lupillo is here complaining of \"pressure\" RE superiorly. This has been   present for the past three days with mattering BE. His wife thinks   personal hygiene might play a part in his symptoms.. He feels vision is   not affected.  He states to have the best vision LE, he has to close RE.   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 Usually sees Dr Burnette.     John Alvad COT 8:37 AM September 27, 2019          CC: right eye \"pressure and draining\"    HPI: Pt is a 79yo M w/ h/o secondary glaucoma (Baerveldt right eye w/ Vasile 2014) and Fuchs dystrophy (s/p DSAEK w/ Page 3/2012). Reports longstanding poor vision w/ right eye - also has ERM and macular hole.     Pressure and draining feeling in right eye has been going on for about 1 week. No eye pain, but does have sharp, shooting sensation occasionally. No flashes/floaters each eye. Vision in right eye seems slightly more blurred than baseline - this fluctuates.     Has been doing WCs daily, ATs 2-3x/day.     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   Artifical Tears as needed   Refresh pm at bedtime both eyes     Review of Imaging:    [facility] visual field 5/4/18   Poor reliability both eyes     OCT retinal nerve fiber layer 11/02/18:   Right eye: lamellar hole. Worsening inferior nasal thinning (-7) now borderline may be related to irregularity of retina. Stable superior thinning.  Areas of drop out.    Left eye: all green stable.     Assessment    1. Blepharitis, each eye   -Pt is syx   -Start WCs and eyelid scrubs BID    2. Dry eye syndrome, each eye    -Pt w/ dryness and mild lag - suspect dryness worsened by incomplete lid closure overnight   -Likely worsened by conj chalasis OU   -INCREASE ATs to " 4-6x/day   -START AT gael at bedtime each eye     3. Trichiasis, RLL   -Epilated 2 trichiatic lashes at SL    4. Ectropion, LLL   -Mild ectropion w/ increased lid laxity   -Pt minimally syx in left eye    -Monitor at present    5. Secondary Glaucoma   - visual field with fluctuation and poor reliability both eyes    - IOP good   - OCT retinal nerve fiber layer last viist  thinner right eye may be related to abnormal tracing. IOP  intraocular pressure excellent, normal and stable left eye     6. Fuchs Endothelial Dystrophy   - s/p DSAEK OD   - on FML, continue every other day   - no longer taking Shan 128   - discussed guttae affecting vision OS, but not bothered enough by vision to pursue DSAEK    7. Epiretinal Membrane, Right Eye - follows with [facility]   Macular pucker   Macular cyst, hole, pseudohole   Not interested in surgical repair    8. Pseudophakia, Both Eyes   IOLs in good position each eye   Observe    RTC 4 weeks for surface recheck    Deandre Dickey MD - PGY 3  Ophthalmology resident    Attending Physician Attestation:  Complete documentation of historical and exam elements from today's encounter can be found in the full encounter summary report (not reduplicated in this progress note).  I personally obtained the chief complaint(s) and history of present illness.  I confirmed and edited as necessary the review of systems, past medical/surgical history, family history, social history, and examination findings as documented by others; and I examined the patient myself.  I personally reviewed the relevant tests, images, and reports as documented above.  I formulated and edited as necessary the assessment and plan and discussed the findings and management plan with the patient and family.Attending Physician Procedure Attestation: I was present for the entire procedure . - Sreedhar Overton MD

## 2019-10-19 ENCOUNTER — APPOINTMENT (OUTPATIENT)
Age: 79
Setting detail: DERMATOLOGY
End: 2019-10-23

## 2019-10-19 PROBLEM — C44.42 SQUAMOUS CELL CARCINOMA OF SKIN OF SCALP AND NECK: Status: ACTIVE | Noted: 2019-10-19

## 2019-10-19 PROCEDURE — OTHER MOHS SURGERY: OTHER

## 2019-10-19 PROCEDURE — A4550 SURGICAL TRAYS: HCPCS

## 2019-10-19 PROCEDURE — 17311 MOHS 1 STAGE H/N/HF/G: CPT

## 2019-10-19 PROCEDURE — 13121 CMPLX RPR S/A/L 2.6-7.5 CM: CPT

## 2019-10-19 NOTE — PROCEDURE: MOHS SURGERY
- - - Island Pedicle Flap-Requiring Vessel Identification Text: The defect edges were debeveled with a #15 scalpel blade.  Given the location of the defect, shape of the defect and the proximity to free margins an island pedicle advancement flap was deemed most appropriate.  Using a sterile surgical marker, an appropriate advancement flap was drawn, based on the axial vessel mentioned above, incorporating the defect, outlining the appropriate donor tissue and placing the expected incisions within the relaxed skin tension lines where possible.    The area thus outlined was incised deep to adipose tissue with a #15 scalpel blade.  The skin margins were undermined to an appropriate distance in all directions around the primary defect and laterally outward around the island pedicle utilizing iris scissors.  There was minimal undermining beneath the pedicle flap.

## 2019-10-19 NOTE — PROCEDURE: MOHS SURGERY
Body Location Override (Optional - Billing Will Still Be Based On Selected Body Map Location If Applicable): left posterior auricular

## 2019-10-31 ENCOUNTER — APPOINTMENT (OUTPATIENT)
Age: 79
Setting detail: DERMATOLOGY
End: 2019-10-31

## 2019-10-31 DIAGNOSIS — Z48.02 ENCOUNTER FOR REMOVAL OF SUTURES: ICD-10-CM

## 2019-10-31 PROCEDURE — OTHER SUTURE REMOVAL (GLOBAL PERIOD): OTHER

## 2019-10-31 ASSESSMENT — LOCATION ZONE DERM: LOCATION ZONE: SCALP

## 2019-10-31 ASSESSMENT — LOCATION SIMPLE DESCRIPTION DERM: LOCATION SIMPLE: SCALP

## 2019-10-31 ASSESSMENT — LOCATION DETAILED DESCRIPTION DERM: LOCATION DETAILED: LEFT SUPERIOR POSTAURICULAR SKIN

## 2019-10-31 NOTE — PROCEDURE: SUTURE REMOVAL (GLOBAL PERIOD)
Detail Level: Simple
Body Location Override (Optional - Billing Will Still Be Based On Selected Body Map Location If Applicable): left posterior auricular
Add 10753 Cpt? (Important Note: In 2017 The Use Of 05941 Is Being Tracked By Cms To Determine Future Global Period Reimbursement For Global Periods): no

## 2019-11-01 NOTE — PROGRESS NOTES
"Chief Complaint(s) and History of Present Illness(es)     Fuch's Dystrophy Follow Up     Laterality: both eyes    Onset: months ago    Quality: States va is the same since last visit      Frequency: constantly    Associated symptoms: tearing and dryness    Treatments tried: eye drops and artificial tears    Pain scale: 0/10              Comments     Mireya Peres COT 10:07 AM November 1, 2019     CC: right eye \"pressure and draining\"    HPI: Pt is a 77yo M w/ h/o secondary glaucoma (Baerveldt right eye w/ Burnette 2014) and Fuchs dystrophy (s/p DSAEK w/ Page 3/2012). Reports longstanding poor vision w/ right eye - also has ERM and macular hole.     Pressure and draining feeling in right eye has been going on for about 1 week. No eye pain, but does have sharp, shooting sensation occasionally. No flashes/floaters each eye. Vision in right eye seems slightly more blurred than baseline - this fluctuates.     Has been doing WCs daily, ATs 2-3x/day.     Interval hx:  Pt reports that he is doing better, w/ decreased dryness each eye. Reports tried Refresh PM gael at bedtime but it made his vision too blurred in the AM, so he stopped using. Reports warm compresses are working very well for him.     Pt's wife reports that Parkinson's control is sl worsened. Pt is having a difficult time w/ daily activities.     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   Artifical Tears as needed   Refresh pm at bedtime both eyes     Review of Imaging:    [facility] visual field 5/4/18   Poor reliability both eyes     OCT retinal nerve fiber layer 11/02/18:   Right eye: lamellar hole. Worsening inferior nasal thinning (-7) now borderline may be related to irregularity of retina. Stable superior thinning.  Areas of drop out.    Left eye: all green stable.     Assessment    1. Blepharitis, each eye   -Pt is syx, but improving w/ lid " hygiene    -Cont WCs and eyelid scrubs BID    2. Dry eye syndrome, each eye    -Pt w/ dryness and mild lag - suspect dryness worsened by incomplete lid closure overnight   -Likely worsened by conj chalasis OU   -INCREASE ATs to 4-6x/day   -Switch to gel gtts as pt says gael was too thick for him at bedtime each eye     3. Trichiasis, BLL   -Epilated 6 trichiatic lashes in right eye and 5 in left eye at     4. Ectropion, LLL   -Mild ectropion w/ increased lid laxity   -Pt minimally syx in left eye    -Monitor at present    5. Secondary Glaucoma   - visual field with fluctuation and poor reliability both eyes    - IOP good   - OCT retinal nerve fiber layer last viist  thinner right eye may be related to abnormal tracing. IOP  intraocular pressure excellent, normal and stable left eye     6. Fuchs Endothelial Dystrophy   - s/p DSAEK OD   - on FML, continue every other day   - no longer taking Shan 128   - discussed guttae affecting vision OS, but not bothered enough by vision to pursue DSAEK    7. Epiretinal Membrane, Right Eye - follows with [facility]   Macular pucker   Macular cyst, hole, pseudohole   Not interested in surgical repair    8. Pseudophakia, Both Eyes   IOLs in good position each eye   Observe    RTC 6 weeks for surface recheck    Deandre Dickey MD - PGY 3  Ophthalmology resident    Attending Physician Attestation:  I did not examine this patient at this encounter, but I was available for discussion. I reviewed the history, examination, any imaging, assessment, and plan as documented. I agree with the plan as detailed by the Treating Resident Physician. Renzo Melo MD Uveitis and Medical Retina  November 1, 2019

## 2019-11-01 NOTE — NURSING NOTE
Chief Complaints and History of Present Illnesses   Patient presents with     Fuch's Dystrophy Follow Up     Chief Complaint(s) and History of Present Illness(es)     Fuch's Dystrophy Follow Up     Laterality: both eyes    Onset: months ago    Quality: States va is the same since last visit      Frequency: constantly    Associated symptoms: tearing and dryness    Treatments tried: eye drops and artificial tears    Pain scale: 0/10              Comments     Mireya LEE 10:07 AM November 1, 2019

## 2020-01-01 ENCOUNTER — HEALTH MAINTENANCE LETTER (OUTPATIENT)
Age: 80
End: 2020-01-01

## 2022-08-24 NOTE — PROCEDURE: MOHS SURGERY
Recommend that he looks at the [facility] system for a PCP. Mohs Method Verbiage: An incision at a 45 degree angle following the standard Mohs approach was done and the specimen was harvested as a microscopic controlled layer.